# Patient Record
Sex: FEMALE | Race: WHITE | URBAN - METROPOLITAN AREA
[De-identification: names, ages, dates, MRNs, and addresses within clinical notes are randomized per-mention and may not be internally consistent; named-entity substitution may affect disease eponyms.]

---

## 2021-11-22 ENCOUNTER — OFFICE VISIT (OUTPATIENT)
Dept: URBAN - METROPOLITAN AREA CLINIC 30 | Facility: CLINIC | Age: 71
End: 2021-11-22
Payer: MEDICARE

## 2021-11-22 DIAGNOSIS — H35.371 PUCKERING OF MACULA, RIGHT EYE: ICD-10-CM

## 2021-11-22 PROCEDURE — 92134 CPTRZ OPH DX IMG PST SGM RTA: CPT | Performed by: STUDENT IN AN ORGANIZED HEALTH CARE EDUCATION/TRAINING PROGRAM

## 2021-11-22 PROCEDURE — 99204 OFFICE O/P NEW MOD 45 MIN: CPT | Performed by: STUDENT IN AN ORGANIZED HEALTH CARE EDUCATION/TRAINING PROGRAM

## 2021-11-22 ASSESSMENT — VISUAL ACUITY
OD: 20/100
OS: 20/20

## 2021-11-22 ASSESSMENT — KERATOMETRY
OD: 44.52
OS: 44.62

## 2021-11-22 ASSESSMENT — INTRAOCULAR PRESSURE
OS: 16
OD: 14

## 2021-11-22 NOTE — IMPRESSION/PLAN
Impression: Combined forms of age-related cataract, bilateral: H25.813.
- OD >> OS
- h/o monovision lasik Plan: Cataracts account for the patient's complaints. Discussed all risks, benefits, procedures and recovery. Patient understands changing glasses will not improve vision. Patient desires to have surgery, recommend phacoemulsification with intraocular lens OD, aim around -2.00, h/o monovision with OD for near.  Not a candidate for MF IOL

## 2021-11-22 NOTE — IMPRESSION/PLAN
Impression: Puckering of macula, right eye: H35.371.
- OCT mac, poor quality but irreg foveal contour Plan: Monitor, seek care with sudden vision changes

## 2022-01-25 ENCOUNTER — ADULT PHYSICAL (OUTPATIENT)
Dept: URBAN - METROPOLITAN AREA CLINIC 30 | Facility: CLINIC | Age: 72
End: 2022-01-25
Payer: MEDICARE

## 2022-01-25 DIAGNOSIS — Z01.818 ENCOUNTER FOR OTHER PREPROCEDURAL EXAMINATION: Primary | ICD-10-CM

## 2022-01-25 PROCEDURE — 99203 OFFICE O/P NEW LOW 30 MIN: CPT | Performed by: PHYSICIAN ASSISTANT

## 2022-02-03 ENCOUNTER — PRE-OPERATIVE VISIT (OUTPATIENT)
Dept: URBAN - METROPOLITAN AREA CLINIC 10 | Facility: CLINIC | Age: 72
End: 2022-02-03
Payer: MEDICARE

## 2022-02-03 DIAGNOSIS — H25.813 COMBINED FORMS OF AGE-RELATED CATARACT, BILATERAL: Primary | ICD-10-CM

## 2022-02-03 PROCEDURE — 99204 OFFICE O/P NEW MOD 45 MIN: CPT | Performed by: OPHTHALMOLOGY

## 2022-02-03 RX ORDER — DICLOFENAC SODIUM 1 MG/ML
0.1 % SOLUTION/ DROPS OPHTHALMIC
Qty: 5 | Refills: 2 | Status: INACTIVE
Start: 2022-02-03 | End: 2022-04-13

## 2022-02-03 RX ORDER — PREDNISOLONE ACETATE 10 MG/ML
1 % SUSPENSION/ DROPS OPHTHALMIC
Qty: 1 | Refills: 2 | Status: INACTIVE
Start: 2022-02-03 | End: 2022-04-13

## 2022-02-03 NOTE — IMPRESSION/PLAN
Impression: Combined forms of age-related cataract, bilateral: H25.813. Plan: Discussed cataract diagnosis with the patient. Discussed and reviewed treatment options for cataracts. Surgical treatment is required for cataracts. Risks and benefits of surgical treatment were discussed and understood. Patient elects surgical treatment. Recommend surgery OD FOR NOW. PLAN STD LENS, NEAR AIM, NO UPGRADES, REVIEWED MOISES,NO  LRI. PATIENT UNDERSTANDS THE NEED FOR GLASSES POST-OP FOR BEST OVER ALL VISION. Discussed limitations to vision post op due to  ERM OD, S/P LASIK . Lucas Gordillo If first eye doing well, ok to proceed with second eye surgery . Lucas Gordillo Discussed 50/50 chance of needing additonal surgery due to previous lasik. ..  DEXYCU

## 2022-02-10 ENCOUNTER — SURGERY (OUTPATIENT)
Dept: URBAN - METROPOLITAN AREA SURGERY 5 | Facility: SURGERY | Age: 72
End: 2022-02-10
Payer: MEDICARE

## 2022-02-10 PROCEDURE — 66984 XCAPSL CTRC RMVL W/O ECP: CPT | Performed by: OPHTHALMOLOGY

## 2022-02-11 ENCOUNTER — POST-OPERATIVE VISIT (OUTPATIENT)
Dept: URBAN - METROPOLITAN AREA CLINIC 30 | Facility: CLINIC | Age: 72
End: 2022-02-11
Payer: MEDICARE

## 2022-02-11 DIAGNOSIS — Z48.810 ENCOUNTER FOR SURGICAL AFTERCARE FOLLOWING SURGERY ON A SENSE ORGAN: Primary | ICD-10-CM

## 2022-02-11 PROCEDURE — 99024 POSTOP FOLLOW-UP VISIT: CPT | Performed by: OPTOMETRIST

## 2022-02-11 ASSESSMENT — INTRAOCULAR PRESSURE: OD: 17

## 2022-02-11 NOTE — IMPRESSION/PLAN
Impression: S/P Cataract Extraction/IOL OD - 1 Day. Encounter for surgical aftercare following surgery on a sense organ  Z48.810.  Post operative instructions reviewed - Plan: near aim -2.00
pain and FBS Tylenol has helped
rec ATs
rtc for final PO

## 2022-03-15 ENCOUNTER — POST-OPERATIVE VISIT (OUTPATIENT)
Dept: URBAN - METROPOLITAN AREA CLINIC 30 | Facility: CLINIC | Age: 72
End: 2022-03-15

## 2022-03-15 PROCEDURE — 99024 POSTOP FOLLOW-UP VISIT: CPT | Performed by: OPTOMETRIST

## 2022-03-15 ASSESSMENT — KERATOMETRY
OS: 43.92
OD: 44.15

## 2022-03-15 ASSESSMENT — INTRAOCULAR PRESSURE: OD: 11

## 2022-03-15 ASSESSMENT — VISUAL ACUITY
OS: 20/30
OD: 20/70

## 2022-03-15 NOTE — IMPRESSION/PLAN
Impression: S/P Cataract Extraction/IOL OD - 33 Days. Encounter for surgical aftercare following surgery on a sense organ  Z48.810. Plan: Using ATs prn-BID. Near aim -2.00
ERM limits OD, had reduced SPH prior to surgery PCO on exam today. Pt ed. Monitor for now. Discussed likely need for YAG laser. [facility] today OD-is on warfarin-onset few days ago CE- YAG eval c MAC OCT next visit in 1 month.

## 2022-04-13 ENCOUNTER — OFFICE VISIT (OUTPATIENT)
Dept: URBAN - METROPOLITAN AREA CLINIC 30 | Facility: CLINIC | Age: 72
End: 2022-04-13
Payer: MEDICARE

## 2022-04-13 DIAGNOSIS — H25.812 COMBINED FORMS OF AGE-RELATED CATARACT, LEFT EYE: ICD-10-CM

## 2022-04-13 DIAGNOSIS — H35.371 PUCKERING OF MACULA, RIGHT EYE: Primary | ICD-10-CM

## 2022-04-13 DIAGNOSIS — H43.811 VITREOUS DEGENERATION, RIGHT EYE: ICD-10-CM

## 2022-04-13 DIAGNOSIS — Z98.890 OTHER SPECIFIED POSTPROCEDURAL STATES: ICD-10-CM

## 2022-04-13 DIAGNOSIS — H35.351 CYSTOID MACULAR DEGENERATION, RIGHT EYE: ICD-10-CM

## 2022-04-13 DIAGNOSIS — H26.491 OTHER SECONDARY CATARACT, RIGHT EYE: ICD-10-CM

## 2022-04-13 DIAGNOSIS — H04.123 TEAR FILM INSUFFICIENCY OF BILATERAL LACRIMAL GLANDS: ICD-10-CM

## 2022-04-13 PROCEDURE — 99214 OFFICE O/P EST MOD 30 MIN: CPT | Performed by: OPTOMETRIST

## 2022-04-13 PROCEDURE — 92134 CPTRZ OPH DX IMG PST SGM RTA: CPT | Performed by: OPTOMETRIST

## 2022-04-13 RX ORDER — DICLOFENAC SODIUM 1 MG/ML
0.1 % SOLUTION/ DROPS OPHTHALMIC
Qty: 5 | Refills: 2 | Status: ACTIVE
Start: 2022-04-13

## 2022-04-13 RX ORDER — PREDNISOLONE ACETATE 10 MG/ML
1 % SUSPENSION/ DROPS OPHTHALMIC
Qty: 10 | Refills: 1 | Status: ACTIVE
Start: 2022-04-13

## 2022-04-13 NOTE — IMPRESSION/PLAN
Impression: Puckering of macula, right eye: H35.371.
- OCT mac, poor quality but irreg foveal contour Plan: Appears stable on exam/MAC OCT. See CME note.

## 2022-04-13 NOTE — IMPRESSION/PLAN
Impression: Cystoid macular degeneration, right eye: H35.351. Plan: Mild, few cysts on OCT. Pt denies using pred or diclofenac after the cataract surgyer. Rec start both TID OD and f/u 4 weeks. 
Can still schedule YAG OD.

## 2022-04-13 NOTE — IMPRESSION/PLAN
Impression: Tear film insufficiency of bilateral lacrimal glands: H04.123. Plan: Plug in place, BLL.

## 2022-04-13 NOTE — IMPRESSION/PLAN
Impression: Other secondary cataract, right eye: H26.491. Plan: Opacified capsule with option for Yag laser capsulotomy OD. Discussed procedure, risks, benefits and side effects. Patient requests Yag laser capsulotomy OD.

## 2022-05-20 ENCOUNTER — OFFICE VISIT (OUTPATIENT)
Dept: URBAN - METROPOLITAN AREA CLINIC 30 | Facility: CLINIC | Age: 72
End: 2022-05-20
Payer: MEDICARE

## 2022-05-20 DIAGNOSIS — H35.351 CYSTOID MACULAR DEGENERATION, RIGHT EYE: Primary | ICD-10-CM

## 2022-05-20 DIAGNOSIS — H26.491 OTHER SECONDARY CATARACT, RIGHT EYE: ICD-10-CM

## 2022-05-20 PROCEDURE — 92134 CPTRZ OPH DX IMG PST SGM RTA: CPT | Performed by: OPTOMETRIST

## 2022-05-20 PROCEDURE — 99213 OFFICE O/P EST LOW 20 MIN: CPT | Performed by: OPTOMETRIST

## 2022-05-20 ASSESSMENT — INTRAOCULAR PRESSURE
OD: 10
OS: 11

## 2022-05-20 ASSESSMENT — VISUAL ACUITY
OD: 20/40
OS: 20/20

## 2022-05-20 ASSESSMENT — KERATOMETRY
OD: 44.71
OS: 44.33

## 2022-05-20 NOTE — IMPRESSION/PLAN
Impression: Other secondary cataract, right eye: H26.491. Plan: Opacified capsule with option for Yag laser capsulotomy OD. Discussed procedure, risks, benefits and side effects. Patient requests Yag laser capsulotomy OD. ERM limits, near aim OD.

## 2022-05-20 NOTE — IMPRESSION/PLAN
Impression: Cystoid macular degeneration, right eye: H35.351. Plan: Appears improved but still few cysts on OCT. May persist c ERM. pred/ diclofenac TID OD-can d/c. Discussed retina consult to address distortion c ERM. Rec Yag OD first and then see retina if desired.

## 2022-06-21 ENCOUNTER — SURGERY (OUTPATIENT)
Dept: URBAN - METROPOLITAN AREA SURGERY 12 | Facility: SURGERY | Age: 72
End: 2022-06-21
Payer: MEDICARE

## 2022-06-21 PROCEDURE — 66821 AFTER CATARACT LASER SURGERY: CPT | Performed by: OPHTHALMOLOGY
